# Patient Record
Sex: MALE | URBAN - METROPOLITAN AREA
[De-identification: names, ages, dates, MRNs, and addresses within clinical notes are randomized per-mention and may not be internally consistent; named-entity substitution may affect disease eponyms.]

---

## 2017-10-19 ENCOUNTER — TELEPHONE (OUTPATIENT)
Dept: NEUROLOGY | Facility: HOSPITAL | Age: 62
End: 2017-10-19

## 2017-10-19 NOTE — TELEPHONE ENCOUNTER
Clinic appt scheduled with pt on 11/6/17 at 130pm.  Pt given clinic address and telephone number. Pt repeated information correctly.

## 2017-11-06 ENCOUNTER — LAB VISIT (OUTPATIENT)
Dept: LAB | Facility: HOSPITAL | Age: 62
End: 2017-11-06
Attending: INTERNAL MEDICINE
Payer: MEDICARE

## 2017-11-06 ENCOUNTER — OFFICE VISIT (OUTPATIENT)
Dept: NEUROLOGY | Facility: HOSPITAL | Age: 62
End: 2017-11-06
Attending: INTERNAL MEDICINE
Payer: MEDICARE

## 2017-11-06 VITALS
HEART RATE: 88 BPM | WEIGHT: 211.88 LBS | RESPIRATION RATE: 16 BRPM | SYSTOLIC BLOOD PRESSURE: 129 MMHG | TEMPERATURE: 99 F | BODY MASS INDEX: 29.66 KG/M2 | DIASTOLIC BLOOD PRESSURE: 77 MMHG | HEIGHT: 71 IN

## 2017-11-06 DIAGNOSIS — D50.0 ANEMIA DUE TO CHRONIC BLOOD LOSS: ICD-10-CM

## 2017-11-06 DIAGNOSIS — D50.0 ANEMIA DUE TO CHRONIC BLOOD LOSS: Primary | ICD-10-CM

## 2017-11-06 LAB
ALBUMIN SERPL BCP-MCNC: 2.8 G/DL
ALP SERPL-CCNC: 86 U/L
ALT SERPL W/O P-5'-P-CCNC: 10 U/L
ANION GAP SERPL CALC-SCNC: 6 MMOL/L
AST SERPL-CCNC: 31 U/L
BASOPHILS # BLD AUTO: 0.05 K/UL
BASOPHILS NFR BLD: 0.6 %
BILIRUB SERPL-MCNC: 0.4 MG/DL
BUN SERPL-MCNC: 15 MG/DL
CALCIUM SERPL-MCNC: 8.8 MG/DL
CHLORIDE SERPL-SCNC: 106 MMOL/L
CO2 SERPL-SCNC: 28 MMOL/L
CREAT SERPL-MCNC: 1.4 MG/DL
DIFFERENTIAL METHOD: ABNORMAL
EOSINOPHIL # BLD AUTO: 0.2 K/UL
EOSINOPHIL NFR BLD: 2.6 %
ERYTHROCYTE [DISTWIDTH] IN BLOOD BY AUTOMATED COUNT: 20.2 %
EST. GFR  (AFRICAN AMERICAN): >60 ML/MIN/1.73 M^2
EST. GFR  (NON AFRICAN AMERICAN): 53 ML/MIN/1.73 M^2
FERRITIN SERPL-MCNC: 10 NG/ML
GLUCOSE SERPL-MCNC: 103 MG/DL
HCT VFR BLD AUTO: 27.2 %
HGB BLD-MCNC: 8.2 G/DL
IRON SERPL-MCNC: 25 UG/DL
LYMPHOCYTES # BLD AUTO: 1.3 K/UL
LYMPHOCYTES NFR BLD: 16.7 %
MCH RBC QN AUTO: 23.2 PG
MCHC RBC AUTO-ENTMCNC: 30.1 G/DL
MCV RBC AUTO: 77 FL
MONOCYTES # BLD AUTO: 0.9 K/UL
MONOCYTES NFR BLD: 10.9 %
NEUTROPHILS # BLD AUTO: 5.5 K/UL
NEUTROPHILS NFR BLD: 68.9 %
PLATELET # BLD AUTO: 266 K/UL
PMV BLD AUTO: 8.7 FL
POTASSIUM SERPL-SCNC: 4 MMOL/L
PROT SERPL-MCNC: 8.4 G/DL
RBC # BLD AUTO: 3.54 M/UL
SATURATED IRON: 5 %
SODIUM SERPL-SCNC: 140 MMOL/L
TOTAL IRON BINDING CAPACITY: 459 UG/DL
TRANSFERRIN SERPL-MCNC: 310 MG/DL
WBC # BLD AUTO: 7.95 K/UL

## 2017-11-06 PROCEDURE — 99215 OFFICE O/P EST HI 40 MIN: CPT | Performed by: INTERNAL MEDICINE

## 2017-11-06 PROCEDURE — 36415 COLL VENOUS BLD VENIPUNCTURE: CPT

## 2017-11-06 PROCEDURE — 80053 COMPREHEN METABOLIC PANEL: CPT

## 2017-11-06 PROCEDURE — 83540 ASSAY OF IRON: CPT

## 2017-11-06 PROCEDURE — 85025 COMPLETE CBC W/AUTO DIFF WBC: CPT

## 2017-11-06 PROCEDURE — 82728 ASSAY OF FERRITIN: CPT

## 2017-11-06 RX ORDER — SUCRALFATE 1 G/1
TABLET ORAL DAILY
COMMUNITY
Start: 2017-10-12

## 2017-11-06 RX ORDER — PANTOPRAZOLE SODIUM 40 MG/1
40 TABLET, DELAYED RELEASE ORAL DAILY
COMMUNITY
Start: 2017-10-12

## 2017-11-06 RX ORDER — METFORMIN HYDROCHLORIDE 500 MG/1
500 TABLET ORAL 2 TIMES DAILY WITH MEALS
COMMUNITY

## 2017-11-06 RX ORDER — ALLOPURINOL 100 MG/1
100 TABLET ORAL 3 TIMES DAILY
COMMUNITY
Start: 2017-11-01

## 2017-11-06 RX ORDER — LISINOPRIL 10 MG/1
10 TABLET ORAL DAILY
COMMUNITY
Start: 2017-10-20

## 2017-11-06 RX ORDER — HYDROCORTISONE 5 MG/1
TABLET ORAL 3 TIMES DAILY PRN
COMMUNITY

## 2017-11-06 RX ORDER — ACETAMINOPHEN AND CODEINE PHOSPHATE 300; 30 MG/1; MG/1
1 TABLET ORAL
COMMUNITY
Start: 2017-11-03

## 2017-11-06 RX ORDER — HYDROCHLOROTHIAZIDE 25 MG/1
25 TABLET ORAL DAILY
COMMUNITY
Start: 2017-08-03

## 2017-11-06 NOTE — PROGRESS NOTES
[facility]    CC: Anemia     HPI 62 y.o. male  presenting as a referral from Dr.Son Evans for chronic intermittent symptomatic anemia a/w recurrent GI bleed described as melena. He has about 2 hospitalizations per year since 2012 with need for blood transfusion. In October he required 4 units of PRBC. He takes oral iron daily with slight intolerance with side effects of nausea and vomiting. He had an EGD/colonoscopy in October 10, 2017 by Dr. Escoto showing an antral ulcer. He takes a PPI twice per day currently. He also has epistaxis with passage of clots. Last occurrence was 2 weeks ago before his hospitalization. 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 There is no family h/o gastrointestinal cancers or HHT. He does not use NSAIDS, alcohol, or blood thinners. He denies bleeding diathesis in his family. He had is appendix removed 3 years ago because he had a polyp on the appendiceal orifice.     Records reviewed and summarized above      Past Medical History:   Diagnosis Date    Arthritis     Depression     Diabetes mellitus, type 2     GERD (gastroesophageal reflux disease)     [facility]patitis C [facility]    Hearing deficit     Hypertension          Past Surgical History:   Procedure Laterality Date    ADENOIDECTOMY         Social History  Social History   Substance Use Topics    Smoking status: Current Every Day Smoker     Packs/day: 0.25     Years: 50.00    Smokeless tobacco: Never Used    Alcohol use No         Family History   Problem Relation Age of Onset    Diabetes Mother     Depression Sister     Depression Brother     Heart disease Maternal Grandmother     Heart attack Maternal Grandmother        Review of Systems  General ROS: negative for weight loss or chills, fever 2 weeks ago related to a septic knee  Psychological ROS: negative for hallucination, depression or suicidal ideation  Ophthalmic ROS: negative for blurry vision, photophobia or eye  "pain  ENT ROS: negative for epistaxis, sore throat or rhinorrhea  Respiratory ROS: no cough, shortness of breath, or wheezing  Cardiovascular ROS: no chest pain or dyspnea on exertion  Gastrointestinal ROS: no abdominal pain, change in bowel habits, or black/ bloody stools + reflux  Genito-Urinary ROS: no dysuria, trouble voiding, or hematuria  Musculoskeletal ROS: negative for gait disturbance or muscular weakness  Neurological ROS: no syncope or seizures; no ataxia  Dermatological ROS: negative for pruritis, rash and jaundice    Physical Examination  /77   Pulse 88   Temp 98.7 °F (37.1 °C) (Oral)   Resp 16   Ht 5' 10.67" (1.795 m)   Wt 96.1 kg (211 lb 13.8 oz)   BMI 29.83 kg/m²   General appearance: alert, cooperative, no distress  HENT: Normocephalic, atraumatic, neck symmetrical, no nasal discharge   Eyes: conjunctivae/corneas clear, PERRL, EOM's intact  Lungs: clear to auscultation bilaterally, no dullness to percussion bilaterally  Heart: regular rate and rhythm without rub; no displacement of the PMI   Abdomen: soft, non-tender; bowel sounds normoactive; no organomegaly + umbilical hernia, + healed surgical scar on RLQ  Extremities: extremities symmetric; no clubbing, cyanosis, +trace edema  Integument: Skin color, texture, turgor normal; no rashes; hair distrubution normal  Neurologic: Alert and oriented X 3, normal strength, normal coordination and gait  Psychiatric: no pressured speech; normal affect; no evidence of impaired cognition     Labs:  3/2017  Hb 8  Plt 214    Imaging:  None on file    Assessment:   62 y.o. male h/o HCV and GERD who is referred for 5 years of biannual recurrent symptomatic anemia and melena with VCE evidence of scattered AVMs. Last month he had an EGD showing a clean based gastric ulcer, which likely represents a different source of blood loss. He also has mild epistaxis that requires tamponade. He is on oral iron daily with side effects of nausea and vomiting. "     Plan:   Antegrade SBE on 11/27/17  If negative for angioectasia, then will need to review VCE prior to any additional testing  Continue oral iron therapy and consider iv iron replacement - as a likely adjunct to any endoscopic therapy   Will obtain VCE records from Dr. Tigist Martinez MD   [address] [place] [address], [address]   [place] [address] [phone]

## 2017-11-06 NOTE — PATIENT INSTRUCTIONS
You are scheduled for an Upper SBE on ___Monday, November 27, 2017______________________________    You should eat light meals the day before the procedure and nothing to eat or drink after midnight the night before your procedure.    You will need to be at the 1st floor admission desk at the hospital on __________Endoscopy staff to contact with arrival time.______________        Labs scheduled today, 1st floor Diagnostics

## 2017-11-16 ENCOUNTER — TELEPHONE (OUTPATIENT)
Dept: NEUROLOGY | Facility: HOSPITAL | Age: 62
End: 2017-11-16

## 2017-11-16 NOTE — TELEPHONE ENCOUNTER
----- Message from Sarahi Chi sent at 11/16/2017 11:41 AM CST -----  Contact: patient   GI- Patient needs to reschedule his procedure.Patients call back number [phone]

## 2017-11-16 NOTE — TELEPHONE ENCOUNTER
----- Message from Muna Meadows LPN sent at 11/6/2017  2:01 PM CST -----  Upper SBE on November 27, 2017.  CPT 50557, [id number].    Thanks.

## 2017-11-17 ENCOUNTER — TELEPHONE (OUTPATIENT)
Dept: NEUROLOGY | Facility: HOSPITAL | Age: 62
End: 2017-11-17

## 2017-11-17 NOTE — TELEPHONE ENCOUNTER
Pt request to reschedule upper SBE from 11/27/17 to 12/4/17.  Pt notified 11/27/17 is not available.  SBE rescheduled to Monday, 12/11/17.  Pt acknowledged understanding.